# Patient Record
Sex: MALE | Race: WHITE | URBAN - METROPOLITAN AREA
[De-identification: names, ages, dates, MRNs, and addresses within clinical notes are randomized per-mention and may not be internally consistent; named-entity substitution may affect disease eponyms.]

---

## 2020-01-10 ENCOUNTER — OFFICE VISIT (OUTPATIENT)
Dept: SURGERY | Age: 20
End: 2020-01-10
Payer: COMMERCIAL

## 2020-01-10 VITALS
HEART RATE: 67 BPM | TEMPERATURE: 97.8 F | BODY MASS INDEX: 22.65 KG/M2 | RESPIRATION RATE: 12 BRPM | SYSTOLIC BLOOD PRESSURE: 112 MMHG | OXYGEN SATURATION: 99 % | WEIGHT: 167.2 LBS | DIASTOLIC BLOOD PRESSURE: 62 MMHG | HEIGHT: 72 IN

## 2020-01-10 PROCEDURE — G8484 FLU IMMUNIZE NO ADMIN: HCPCS | Performed by: PLASTIC SURGERY

## 2020-01-10 PROCEDURE — 1036F TOBACCO NON-USER: CPT | Performed by: PLASTIC SURGERY

## 2020-01-10 PROCEDURE — G8427 DOCREV CUR MEDS BY ELIG CLIN: HCPCS | Performed by: PLASTIC SURGERY

## 2020-01-10 PROCEDURE — 99202 OFFICE O/P NEW SF 15 MIN: CPT | Performed by: PLASTIC SURGERY

## 2020-01-10 PROCEDURE — G8420 CALC BMI NORM PARAMETERS: HCPCS | Performed by: PLASTIC SURGERY

## 2020-01-10 SDOH — HEALTH STABILITY: MENTAL HEALTH: HOW OFTEN DO YOU HAVE A DRINK CONTAINING ALCOHOL?: NEVER

## 2020-01-13 ENCOUNTER — TELEPHONE (OUTPATIENT)
Dept: SURGERY | Age: 20
End: 2020-01-13

## 2020-01-13 NOTE — TELEPHONE ENCOUNTER
Office Procedure needs scheduled    Excision left scalp nevus sebaceous of Chinmay     Per [employer] no prior auth., is required.   Cpt 77158-24218  icd 10 D22.9

## 2020-02-14 ENCOUNTER — PROCEDURE VISIT (OUTPATIENT)
Dept: SURGERY | Age: 20
End: 2020-02-14
Payer: COMMERCIAL

## 2020-02-14 ENCOUNTER — HOSPITAL ENCOUNTER (OUTPATIENT)
Age: 20
Discharge: HOME | End: 2020-02-16
Payer: COMMERCIAL

## 2020-02-14 VITALS
HEIGHT: 73 IN | SYSTOLIC BLOOD PRESSURE: 103 MMHG | TEMPERATURE: 97.5 F | WEIGHT: 170 LBS | OXYGEN SATURATION: 97 % | BODY MASS INDEX: 22.53 KG/M2 | RESPIRATION RATE: 16 BRPM | DIASTOLIC BLOOD PRESSURE: 64 MMHG | HEART RATE: 65 BPM

## 2020-02-14 PROCEDURE — 12032 INTMD RPR S/A/T/EXT 2.6-7.5: CPT | Performed by: PLASTIC SURGERY

## 2020-02-14 PROCEDURE — 88305 TISSUE EXAM BY PATHOLOGIST: CPT

## 2020-02-14 PROCEDURE — 11424 EXC H-F-NK-SP B9+MARG 3.1-4: CPT | Performed by: PLASTIC SURGERY

## 2020-02-14 RX ORDER — ACETAMINOPHEN AND CODEINE PHOSPHATE 300; 30 MG/1; MG/1
1 TABLET ORAL EVERY 4 HOURS PRN
Qty: 10 TABLET | Refills: 0 | Status: SHIPPED | OUTPATIENT
Start: 2020-02-14 | End: 2020-02-19

## 2020-02-14 RX ORDER — LIDOCAINE HYDROCHLORIDE AND EPINEPHRINE 10; 10 MG/ML; UG/ML
3 INJECTION, SOLUTION INFILTRATION; PERINEURAL ONCE
Status: COMPLETED | OUTPATIENT
Start: 2020-02-14 | End: 2020-02-14

## 2020-02-14 RX ADMIN — LIDOCAINE HYDROCHLORIDE AND EPINEPHRINE 3 ML: 10; 10 INJECTION, SOLUTION INFILTRATION; PERINEURAL at 13:27

## 2020-02-14 NOTE — PROGRESS NOTES
allow soap and water to rinse of the incision site while showering. Once they are done in the shower they are to pat dry the incision site with a clean paper towel. No baths, hot tubs or soaking of the wound site at this time. Pt voices understanding. Dressings /Splint /Wound Care: keep wound clean and dry and apply bacitracin twice daily  Medications: As prescribed  Educated to contact physician with concerns or signs of infection (redness, increasing pain, discharge, odor, fever). The entirety of the procedure was performed by Dr. Jaxson Paul     Please note that the medical decision making for the patient as well as the subjective, objective, assessment and plan were reviewed and performed by Dr Edgard Soni 7 days    I attest that the patient was seen and examined by me, and concur with the documentation above. I agree with the assessment and the plan outlined. This document is generated, in part, by voice recognition software and thus  syntax and grammatical errors are possible.     Grayce Hodgkin  9:02 AM  2/20/2020

## 2020-02-21 ENCOUNTER — OFFICE VISIT (OUTPATIENT)
Dept: SURGERY | Age: 20
End: 2020-02-21

## 2020-02-21 PROCEDURE — 99024 POSTOP FOLLOW-UP VISIT: CPT | Performed by: PHYSICIAN ASSISTANT

## 2023-08-02 NOTE — PROGRESS NOTES
Department of Plastic Surgery - Adult  Attending Consult Note      CHIEF COMPLAINT:  Lesion of left scalp     History Obtained From:  patient    HISTORY OF PRESENT ILLNESS:                The patient is a 23 y.o. male who presents with left scalp lesion. The patient states that they first noticed the lesion several years ago, possibly from [place]. It has not grown in size since they first noticed the lesion. The lesion has somewhat changed in color and has not had discharge or bleeding. The pt has no the lesion biopsied previously. The patient has not had the lesion removed previously. The patient states the lesion is unappealing and gets in the way of a haircut painfull. The pt denies any associated symptoms. Past Medical History:    No past medical history on file. Past Surgical History:    No past surgical history on file. Current Medications:   No current facility-administered medications for this visit. Allergies:  Patient has no known allergies.     Social History:   Social History     Socioeconomic History    Marital status: Unknown     Spouse name: Not on file    Number of children: Not on file    Years of education: Not on file    Highest education level: Not on file   Occupational History    Not on file   Social Needs    Financial resource strain: Not on file    Food insecurity:     Worry: Not on file     Inability: Not on file    Transportation needs:     Medical: Not on file     Non-medical: Not on file   Tobacco Use    Smoking status: Never Smoker    Smokeless tobacco: Never Used   Substance and Sexual Activity    Alcohol use: Never     Frequency: Never    Drug use: Never    Sexual activity: Not on file   Lifestyle    Physical activity:     Days per week: Not on file     Minutes per session: Not on file    Stress: Not on file   Relationships    Social connections:     Talks on phone: Not on file     Gets together: Not on file     Attends Taoist service: Not on file Duplicate call to reschedule canceled appointment.   Active member of club or organization: Not on file     Attends meetings of clubs or organizations: Not on file     Relationship status: Not on file    Intimate partner violence:     Fear of current or ex partner: Not on file     Emotionally abused: Not on file     Physically abused: Not on file     Forced sexual activity: Not on file   Other Topics Concern    Not on file   Social History Narrative    Not on file     Family History:   No family history on file. REVIEW OF SYSTEMS:    CONSTITUTIONAL:  negative for  fevers, chills, sweats and fatigue  EYES: negative for dipolpia or acute vision loss. RESPIRATORY:  negative for  dry cough, cough with sputum, dyspnea, wheezing and chest pain  HENT:negative for pain, headache, difficulty swallowing or nose bleeds. CARDIOVASCULAR:  negative for  chest pain, dyspnea, palpitations, syncope  GASTROINTESTINAL:  negative for nausea, vomiting, change in bowel habits, diarrhea, constipation and abdominal pain  EXTREMITIES: negative for edema  MUSCULOSKELETAL: negative for muscle weakness  SKIN: positive for lesionnegative for itching or rashes. HEME: negative for easy brusing or bleeding  BEHAVIOR/PSYCH:  negative for poor appetite, increased appetite, decreased sleep and poor concentration      PHYSICAL EXAM:    VITALS:  /62 (Site: Left Upper Arm, Position: Sitting, Cuff Size: Medium Adult)   Pulse 67   Temp 97.8 °F (36.6 °C) (Tympanic)   Resp 12   Ht 6' (1.829 m)   Wt 167 lb 3.2 oz (75.8 kg)   SpO2 99%   BMI 22.68 kg/m²   CONSTITUTIONAL:  awake, alert, cooperative, no apparent distress, and appears stated age  EYES: PERRLA, EOMI, no signs of occular infection  LUNGS:  No increased work of breathing, good air exchange  CARDIOVASCULAR:  regular rate and rhythm   EXTREMITIES: no signs of clubbing or cyanosis. MUSCULOSKELETAL: negative for flaccid muscle tone or spastic movements. NEURO: Cranial nerves II-XII grossly intact. No signs of agitated mood. SKIN: left scalp lesion-  30 mm x 5mm,  Purple red in color, irregular border, raised, no signs of bleeding,drainage or infection. Non tender to palpation. 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  -The risks, benefits and options were discussed with the pt. The risks included but not limited to pain, bleeding, infection, heavy scarring, damage to surrounding structures, fluid collections, asymmetry, and need for further procedures. All of His questions were answered to their satisfaction and He agrees to proceed with the procedure. Photos obtained    Seen, examined, discussed with Dr. Nick Celis    Electronically signed by Greg Lynch DO on 1/10/2020 at 11:26 AM    Attending Physician Statement  I have discussed the case, including pertinent history and exam findings with the resident. I have seen and examined the patient and the key elements of all parts of the encounter have been performed by me. I agree with the assessment, exam, plan and orders as documented by the resident. I attest that the patient was seen and examined by me, and concur with the documentation above. I agree with the assessment and the plan outlined. This document is generated, in part, by voice recognition software and thus  syntax and grammatical errors are possible.     Twan Hu

## 2023-08-28 ENCOUNTER — HOSPITAL ENCOUNTER (EMERGENCY)
Age: 23
Discharge: HOME | End: 2023-08-28
Payer: COMMERCIAL

## 2023-08-28 VITALS
WEIGHT: 180 LBS | OXYGEN SATURATION: 99 % | HEART RATE: 100 BPM | BODY MASS INDEX: 23.86 KG/M2 | DIASTOLIC BLOOD PRESSURE: 81 MMHG | HEIGHT: 73 IN | RESPIRATION RATE: 16 BRPM | SYSTOLIC BLOOD PRESSURE: 136 MMHG | TEMPERATURE: 97.9 F

## 2023-08-28 DIAGNOSIS — T24.499A: Primary | ICD-10-CM

## 2023-08-28 PROCEDURE — 6370000000 HC RX 637 (ALT 250 FOR IP): Performed by: PHYSICIAN ASSISTANT

## 2023-08-28 PROCEDURE — 16020 DRESS/DEBRID P-THICK BURN S: CPT

## 2023-08-28 PROCEDURE — 99283 EMERGENCY DEPT VISIT LOW MDM: CPT

## 2023-08-28 RX ORDER — IBUPROFEN 600 MG/1
600 TABLET ORAL EVERY 6 HOURS PRN
Qty: 20 TABLET | Refills: 0 | Status: SHIPPED | OUTPATIENT
Start: 2023-08-28 | End: 2023-09-02

## 2023-08-28 RX ORDER — CEPHALEXIN 500 MG/1
500 CAPSULE ORAL 4 TIMES DAILY
Qty: 28 CAPSULE | Refills: 0 | Status: SHIPPED | OUTPATIENT
Start: 2023-08-28 | End: 2023-09-04

## 2023-08-28 RX ORDER — CEPHALEXIN 500 MG/1
500 CAPSULE ORAL ONCE
Status: COMPLETED | OUTPATIENT
Start: 2023-08-28 | End: 2023-08-28

## 2023-08-28 RX ADMIN — CEPHALEXIN 500 MG: 500 CAPSULE ORAL at 22:17

## 2023-08-29 NOTE — ED NOTES
Discharge instructions given. Patient verbalizes understanding. No other noted or stated problems at this time.  Patient will follow up with Emmanuel Childers RN  08/28/23 3000
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
## 2024-07-02 ENCOUNTER — OFFICE VISIT (OUTPATIENT)
Dept: FAMILY MEDICINE CLINIC | Age: 24
End: 2024-07-02
Payer: COMMERCIAL

## 2024-07-02 VITALS
HEART RATE: 75 BPM | WEIGHT: 175.8 LBS | DIASTOLIC BLOOD PRESSURE: 62 MMHG | RESPIRATION RATE: 19 BRPM | TEMPERATURE: 98.4 F | SYSTOLIC BLOOD PRESSURE: 110 MMHG | OXYGEN SATURATION: 98 % | HEIGHT: 73 IN | BODY MASS INDEX: 23.3 KG/M2

## 2024-07-02 DIAGNOSIS — L23.7 POISON IVY: Primary | ICD-10-CM

## 2024-07-02 PROCEDURE — G8420 CALC BMI NORM PARAMETERS: HCPCS

## 2024-07-02 PROCEDURE — 99203 OFFICE O/P NEW LOW 30 MIN: CPT

## 2024-07-02 PROCEDURE — 96372 THER/PROPH/DIAG INJ SC/IM: CPT

## 2024-07-02 PROCEDURE — 1036F TOBACCO NON-USER: CPT

## 2024-07-02 PROCEDURE — G8427 DOCREV CUR MEDS BY ELIG CLIN: HCPCS

## 2024-07-02 RX ORDER — PREDNISONE 20 MG/1
TABLET ORAL
Qty: 18 TABLET | Refills: 0 | Status: SHIPPED | OUTPATIENT
Start: 2024-07-02

## 2024-07-02 RX ORDER — CLOBETASOL PROPIONATE 0.5 MG/G
OINTMENT TOPICAL
Qty: 30 G | Refills: 0 | Status: SHIPPED | OUTPATIENT
Start: 2024-07-02

## 2024-07-02 RX ORDER — TRIAMCINOLONE ACETONIDE 40 MG/ML
40 INJECTION, SUSPENSION INTRA-ARTICULAR; INTRAMUSCULAR ONCE
Status: COMPLETED | OUTPATIENT
Start: 2024-07-02 | End: 2024-07-02

## 2024-07-02 RX ADMIN — TRIAMCINOLONE ACETONIDE 40 MG: 40 INJECTION, SUSPENSION INTRA-ARTICULAR; INTRAMUSCULAR at 12:52

## 2024-07-02 NOTE — PROGRESS NOTES
Chief Complaint:   Poison Ivy      History of Present Illness   Source of history provided by:  patient.     Naga Vann is a 23 y.o. old male who presents to the walk-in for evaluation of a rash to the arms, legs, chest, neck, and face x 2 weeks. States the rash occurred shortly after working outdoors and suspects poison ivy exposure. Reports associated erythema, mild burning, and pruritis. Denies any bleeding or drainage. Denies any lymphangitic streaking, fever, chills, HA, dyspnea, dysphagia, recent illness, myalgias, vomiting, or lethargy.      Review of Systems   Unless otherwise stated in this report or unable to obtain because of the patient's clinical or mental status as evidenced by the medical record, this patients's positive and negative responses for Review of Systems, constitutional, psych, eyes, ENT, cardiovascular, respiratory, gastrointestinal, neurological, genitourinary, musculoskeletal, integument systems and systems related to the presenting problem are either stated in the preceding or were not pertinent or were negative for the symptoms and/or complaints related to the medical problem.    Past Medical History:  has no past medical history on file.   Past Surgical History:  has no past surgical history on file.  Social History:  reports that he has never smoked. He has never used smokeless tobacco. He reports that he does not drink alcohol and does not use drugs.  Family History: family history is not on file.  Allergies: Patient has no known allergies.    Physical Exam   Vital Signs:  /62 (Site: Right Upper Arm, Position: Sitting)   Pulse 75   Temp 98.4 °F (36.9 °C) (Temporal)   Resp 19   Ht 1.854 m (6' 1\")   Wt 79.7 kg (175 lb 12.8 oz)   SpO2 98%   BMI 23.19 kg/m²    Oxygen Saturation Interpretation: Normal.    Constitutional:  Alert, development consistent with age.  HEENT:  NC/NT.  Airway patent.  Eyes:  PERRL, EOMI, no discharge.     Lungs:  CTAB, no wheezing, rales, or

## 2025-02-03 ENCOUNTER — OFFICE VISIT (OUTPATIENT)
Dept: FAMILY MEDICINE CLINIC | Age: 25
End: 2025-02-03
Payer: COMMERCIAL

## 2025-02-03 VITALS
DIASTOLIC BLOOD PRESSURE: 62 MMHG | OXYGEN SATURATION: 98 % | RESPIRATION RATE: 18 BRPM | WEIGHT: 203 LBS | HEART RATE: 102 BPM | BODY MASS INDEX: 26.9 KG/M2 | TEMPERATURE: 98.8 F | SYSTOLIC BLOOD PRESSURE: 118 MMHG | HEIGHT: 73 IN

## 2025-02-03 DIAGNOSIS — R68.89 FLU-LIKE SYMPTOMS: ICD-10-CM

## 2025-02-03 DIAGNOSIS — J06.9 URI WITH COUGH AND CONGESTION: Primary | ICD-10-CM

## 2025-02-03 LAB
INFLUENZA A ANTIBODY: NEGATIVE
INFLUENZA B ANTIBODY: NEGATIVE

## 2025-02-03 PROCEDURE — G8419 CALC BMI OUT NRM PARAM NOF/U: HCPCS

## 2025-02-03 PROCEDURE — G8427 DOCREV CUR MEDS BY ELIG CLIN: HCPCS

## 2025-02-03 PROCEDURE — 99213 OFFICE O/P EST LOW 20 MIN: CPT

## 2025-02-03 PROCEDURE — 87804 INFLUENZA ASSAY W/OPTIC: CPT

## 2025-02-03 PROCEDURE — 1036F TOBACCO NON-USER: CPT

## 2025-02-03 RX ORDER — METHYLPREDNISOLONE 4 MG/1
TABLET ORAL
Qty: 1 KIT | Refills: 0 | Status: SHIPPED | OUTPATIENT
Start: 2025-02-03

## 2025-02-03 RX ORDER — CEFDINIR 300 MG/1
300 CAPSULE ORAL EVERY 12 HOURS
Qty: 20 CAPSULE | Refills: 0 | Status: SHIPPED | OUTPATIENT
Start: 2025-02-03 | End: 2025-02-13

## 2025-02-03 NOTE — PROGRESS NOTES
noted.    Neck:  Supple. There is no anterior cervical adenopathy.  Lungs: CTAB without wheezes, rales, or rhonchi  Heart:  Regular rate and rhythm, normal heart sounds, without pathological murmurs, ectopy, gallops, or rubs.  Skin:  Normal turgor.  Warm, dry, without visible rash.  Neurological:  Alert and oriented.  Motor functions intact.  Responds to verbal commands.     Lab / Imaging Results   (All laboratory and radiology results have been personally reviewed by myself)  Labs:  Results for orders placed or performed in visit on 02/03/25   POCT Influenza A/B   Result Value Ref Range    Influenza A Ab negative     Influenza B Ab negative        Imaging:  All Radiology results interpreted by Radiologist unless otherwise noted.      Assessment / Plan     Impression(s):  Naga was seen today for chest congestion and fever.    Diagnoses and all orders for this visit:    URI with cough and congestion  -     cefdinir (OMNICEF) 300 MG capsule; Take 1 capsule by mouth every 12 hours for 10 days  -     methylPREDNISolone (MEDROL DOSEPACK) 4 MG tablet; Take by mouth.    Flu-like symptoms  -     POCT Influenza A/B      Disposition:  Disposition: Discharge to home.    Vital signs, past medical history, medications, and allergies reviewed at visit.    Increase fluids and rest. Additional symptomatic relief discussed.    Script written for cefdinir and Medrol Dosepak, side effects discussed.      Follow up PCP no improvement after 7-10 days of symptoms. ED sooner if symptoms worsen or change. Red flag symptoms discussed. Pt is in agreement with this care plan. All questions answered.    NAMITA Mandel    **This report was transcribed using voice recognition software. The patient (or guardian, if applicable) and other individuals in attendance with the patient were advised that if Artificial Intelligence would be utilized during this visit to record and process the conversation to generate a clinical note they would be

## 2025-04-23 ENCOUNTER — OFFICE VISIT (OUTPATIENT)
Dept: FAMILY MEDICINE CLINIC | Age: 25
End: 2025-04-23
Payer: COMMERCIAL

## 2025-04-23 VITALS
HEART RATE: 100 BPM | RESPIRATION RATE: 16 BRPM | HEIGHT: 73 IN | TEMPERATURE: 97.8 F | WEIGHT: 195 LBS | OXYGEN SATURATION: 98 % | BODY MASS INDEX: 25.84 KG/M2 | SYSTOLIC BLOOD PRESSURE: 116 MMHG | DIASTOLIC BLOOD PRESSURE: 68 MMHG

## 2025-04-23 DIAGNOSIS — K92.1 HEMATOCHEZIA: ICD-10-CM

## 2025-04-23 DIAGNOSIS — K62.89 RECTAL DISCOMFORT: Primary | ICD-10-CM

## 2025-04-23 LAB
HEMOCCULT STL QL: NORMAL
HEMOCCULT STL QL: POSITIVE
HEMOCCULT STL QL: POSITIVE

## 2025-04-23 PROCEDURE — 82270 OCCULT BLOOD FECES: CPT | Performed by: NURSE PRACTITIONER

## 2025-04-23 PROCEDURE — 1036F TOBACCO NON-USER: CPT | Performed by: NURSE PRACTITIONER

## 2025-04-23 PROCEDURE — G8419 CALC BMI OUT NRM PARAM NOF/U: HCPCS | Performed by: NURSE PRACTITIONER

## 2025-04-23 PROCEDURE — 99213 OFFICE O/P EST LOW 20 MIN: CPT | Performed by: NURSE PRACTITIONER

## 2025-04-23 PROCEDURE — G8427 DOCREV CUR MEDS BY ELIG CLIN: HCPCS | Performed by: NURSE PRACTITIONER

## 2025-04-23 RX ORDER — HYDROCORTISONE 25 MG/G
CREAM TOPICAL 2 TIMES DAILY
Qty: 1 EACH | Refills: 0 | Status: SHIPPED | OUTPATIENT
Start: 2025-04-23 | End: 2025-04-30

## 2025-04-23 NOTE — PROGRESS NOTES
25  Naga Vann : 2000 Sex: male  Age 24 y.o.    Subjective:  Chief Complaint   Patient presents with    Hemorrhoids       HPI:   Naga Vann , 24 y.o. male presents to the clinic for evaluation of mild rectal pain x 7 days. The patient also reports  minimal blood with wiping after bowel movements.  The patient reports possible hemorrhoid.  The patient has taken OTC creams for symptoms. The patient reports improving symptoms over time. Denies rectal injury, constipation. Denies headache, fever, chest pain, abdominal pain, shortness of breath, and nausea / vomiting / diarrhea.    ROS:   Unless otherwise stated in this report the patient's positive and negative responses for review of systems for constitutional, eyes, ENT, cardiovascular, respiratory, gastrointestinal, neurological, , musculoskeletal, and integument systems and related systems to the presenting problem are either stated in the history of present illness or were not pertinent or were negative for the symptoms and/or complaints related to the presenting medical problem.  Positives and pertinent negatives as per HPI.  All others reviewed and are negative.      PMH:   History reviewed. No pertinent past medical history.    History reviewed. No pertinent surgical history.    History reviewed. No pertinent family history.    Medications:     Current Outpatient Medications:     hydrocortisone (ANUSOL-HC) 2.5 % CREA rectal cream, Place rectally 2 times daily for 7 days, Disp: 1 each, Rfl: 0    Allergies:   No Known Allergies    Social History:     Social History     Tobacco Use    Smoking status: Never    Smokeless tobacco: Never   Substance Use Topics    Alcohol use: Never    Drug use: Never       Physical Exam:     Vitals:    25 1049   BP: 116/68   Pulse: 100   Resp: 16   Temp: 97.8 °F (36.6 °C)   SpO2: 98%   Weight: 88.5 kg (195 lb)   Height: 1.854 m (6' 0.99\")       Physical Exam (PE)    Physical Exam  Constitutional: